# Patient Record
Sex: FEMALE | URBAN - NONMETROPOLITAN AREA
[De-identification: names, ages, dates, MRNs, and addresses within clinical notes are randomized per-mention and may not be internally consistent; named-entity substitution may affect disease eponyms.]

---

## 2024-01-25 ENCOUNTER — OFFICE VISIT (OUTPATIENT)
Dept: URBAN - NONMETROPOLITAN AREA CLINIC 13 | Facility: CLINIC | Age: 39
End: 2024-01-25
Payer: COMMERCIAL

## 2024-01-25 ENCOUNTER — LAB OUTSIDE AN ENCOUNTER (OUTPATIENT)
Dept: URBAN - NONMETROPOLITAN AREA CLINIC 13 | Facility: CLINIC | Age: 39
End: 2024-01-25

## 2024-01-25 VITALS
BODY MASS INDEX: 35.69 KG/M2 | HEIGHT: 67 IN | DIASTOLIC BLOOD PRESSURE: 93 MMHG | HEART RATE: 114 BPM | WEIGHT: 227.4 LBS | SYSTOLIC BLOOD PRESSURE: 136 MMHG

## 2024-01-25 DIAGNOSIS — R10.30 LOWER ABDOMINAL PAIN: ICD-10-CM

## 2024-01-25 DIAGNOSIS — R93.3 ABN FINDINGS-GI TRACT: ICD-10-CM

## 2024-01-25 DIAGNOSIS — D25.9 UTERINE LEIOMYOMA, UNSPECIFIED LOCATION: ICD-10-CM

## 2024-01-25 DIAGNOSIS — Z98.84 HX OF GASTRIC BYPASS: ICD-10-CM

## 2024-01-25 PROBLEM — 275538002: Status: ACTIVE | Noted: 2024-01-25

## 2024-01-25 PROCEDURE — 99204 OFFICE O/P NEW MOD 45 MIN: CPT | Performed by: NURSE PRACTITIONER

## 2024-01-25 RX ORDER — DICYCLOMINE HYDROCHLORIDE 10 MG/1
1 CAPSULE 30 MINUTES BEFORE EATING CAPSULE ORAL
Qty: 60 | Refills: 11 | OUTPATIENT
Start: 2024-01-25 | End: 2025-01-19

## 2024-01-25 NOTE — HPI-TODAY'S VISIT:
Ms. Linda is a 38-year-old female who presents today for lower abdominal pain x 6 months.  She has a history of gastric bypass and typically has intermittent nausea and occasional abdominal pain, but this pain is more constant and lower than usual.  She presented to her gynecologist where she was diagnosed with anemia thought secondary to menorrhagia.  Pelvic ultrasound was also obtained showing bilateral fibroids.  Abdominal pain persisted and she presented to [place] where CT scan showed mild wall thickening involving the anterior sigmoid colon that may be related to incomplete distention, and a mild component of colitis was difficult to exclude.  Colonoscopy is recommended to rule out pathologic cause.  No previous colonoscopy.  No change in her bowel habits.  Typically has 1 soft formed stool per day.  Denies any hematochezia, melena, or hematemesis. States she has had dark green stools 2/2 oral iron intake.  No family history of colon cancer.  LG.

## 2024-04-24 ENCOUNTER — COLON (OUTPATIENT)
Dept: URBAN - NONMETROPOLITAN AREA SURGERY CENTER 1 | Facility: SURGERY CENTER | Age: 39
End: 2024-04-24

## 2024-05-21 ENCOUNTER — OFFICE VISIT (OUTPATIENT)
Dept: URBAN - NONMETROPOLITAN AREA CLINIC 13 | Facility: CLINIC | Age: 39
End: 2024-05-21
Payer: COMMERCIAL

## 2024-05-21 ENCOUNTER — LAB OUTSIDE AN ENCOUNTER (OUTPATIENT)
Dept: URBAN - NONMETROPOLITAN AREA CLINIC 13 | Facility: CLINIC | Age: 39
End: 2024-05-21

## 2024-05-21 ENCOUNTER — CLAIMS CREATED FROM THE CLAIM WINDOW (OUTPATIENT)
Dept: URBAN - NONMETROPOLITAN AREA CLINIC 13 | Facility: CLINIC | Age: 39
End: 2024-05-21

## 2024-05-21 ENCOUNTER — DASHBOARD ENCOUNTERS (OUTPATIENT)
Age: 39
End: 2024-05-21

## 2024-05-21 VITALS
HEART RATE: 109 BPM | DIASTOLIC BLOOD PRESSURE: 88 MMHG | HEIGHT: 67 IN | SYSTOLIC BLOOD PRESSURE: 137 MMHG | WEIGHT: 229 LBS | BODY MASS INDEX: 35.94 KG/M2

## 2024-05-21 DIAGNOSIS — Z98.84 HX OF GASTRIC BYPASS: ICD-10-CM

## 2024-05-21 DIAGNOSIS — R10.84 ABDOMINAL PAIN, GENERALIZED: ICD-10-CM

## 2024-05-21 DIAGNOSIS — Z86.2 HISTORY OF ANEMIA: ICD-10-CM

## 2024-05-21 PROCEDURE — 99214 OFFICE O/P EST MOD 30 MIN: CPT | Performed by: NURSE PRACTITIONER

## 2024-05-21 RX ORDER — DICYCLOMINE HYDROCHLORIDE 10 MG/1
1 CAPSULE 30 MINUTES BEFORE EATING CAPSULE ORAL
Qty: 60 | Refills: 11 | OUTPATIENT

## 2024-05-21 RX ORDER — DICYCLOMINE HYDROCHLORIDE 10 MG/1
1 CAPSULE 30 MINUTES BEFORE EATING CAPSULE ORAL
Qty: 60 | Refills: 11 | Status: ACTIVE | COMMUNITY
Start: 2024-01-25 | End: 2025-01-19

## 2024-05-31 ENCOUNTER — OFFICE VISIT (OUTPATIENT)
Dept: URBAN - NONMETROPOLITAN AREA SURGERY CENTER 1 | Facility: SURGERY CENTER | Age: 39
End: 2024-05-31

## 2024-06-27 ENCOUNTER — OFFICE VISIT (OUTPATIENT)
Dept: URBAN - NONMETROPOLITAN AREA CLINIC 13 | Facility: CLINIC | Age: 39
End: 2024-06-27
Payer: COMMERCIAL

## 2024-06-27 VITALS
WEIGHT: 224.4 LBS | HEART RATE: 94 BPM | BODY MASS INDEX: 35.22 KG/M2 | SYSTOLIC BLOOD PRESSURE: 123 MMHG | HEIGHT: 67 IN | DIASTOLIC BLOOD PRESSURE: 90 MMHG

## 2024-06-27 DIAGNOSIS — Z86.2 HISTORY OF ANEMIA: ICD-10-CM

## 2024-06-27 DIAGNOSIS — Z98.84 HX OF GASTRIC BYPASS: ICD-10-CM

## 2024-06-27 DIAGNOSIS — R10.30 LOWER ABDOMINAL PAIN: ICD-10-CM

## 2024-06-27 DIAGNOSIS — D25.9 UTERINE LEIOMYOMA, UNSPECIFIED LOCATION: ICD-10-CM

## 2024-06-27 DIAGNOSIS — K58.0 IRRITABLE BOWEL SYNDROME WITH DIARRHEA: ICD-10-CM

## 2024-06-27 DIAGNOSIS — R93.3 ABNORMAL COMPUTED TOMOGRAPHY OF SIGMOID COLON: ICD-10-CM

## 2024-06-27 PROBLEM — 197125005: Status: ACTIVE | Noted: 2024-06-27

## 2024-06-27 PROCEDURE — 99214 OFFICE O/P EST MOD 30 MIN: CPT | Performed by: NURSE PRACTITIONER

## 2024-06-27 RX ORDER — DICYCLOMINE HYDROCHLORIDE 10 MG/1
1 CAPSULE 30 MINUTES BEFORE EATING CAPSULE ORAL
Qty: 60 | Refills: 11 | Status: ACTIVE | COMMUNITY

## 2024-06-27 RX ORDER — RIFAXIMIN 550 MG/1
1 TABLET TABLET ORAL THREE TIMES A DAY
Qty: 42 TABLET | Refills: 0 | OUTPATIENT
Start: 2024-06-27 | End: 2024-07-11

## 2024-06-27 RX ORDER — DICYCLOMINE HYDROCHLORIDE 10 MG/1
1 CAPSULE 30 MINUTES BEFORE EATING CAPSULE ORAL
Qty: 60 | Refills: 11 | OUTPATIENT

## 2024-06-27 NOTE — HPI-OTHER HISTORIES
1/2024:  Ms. Linda is a 38-year-old female who presents today for lower abdominal pain x 6 months. She has a history of gastric bypass and typically has intermittent nausea and occasional abdominal pain, but this pain is more constant and lower than usual. She presented to her gynecologist where she was diagnosed with anemia thought secondary to menorrhagia. Pelvic ultrasound was also obtained showing bilateral fibroids. Abdominal pain persisted and she presented to [place] where CT scan showed mild wall thickening involving the anterior sigmoid colon that may be related to incomplete distention, and a mild component of colitis was difficult to exclude. Colonoscopy is recommended to rule out pathologic cause. No previous colonoscopy. No change in her bowel habits. Typically has 1 soft formed stool per day. Denies any hematochezia, melena, or hematemesis. States she has had dark green stools 2/2 oral iron intake. No family history of colon cancer. LG.  5/21/2024: Ms. Alyssa Linda is a 38-year-old female who presents today for follow-up of lower abdominal pain. Since her last visit she underwent laparoscopic removal of uterine fibroids. Her bleeding and pain has since resolved. She takes Bentyl as needed for intermittent cramping that she experiences prior to a bowel movement. Her concern today is intermittent indigestion" noises "that her stomach is began to make since her surgery. Occurs after she eats. She has a history of gastric bypass and is concerned about the anatomy. We discussed starting a probiotic and she agrees to this plan. Requesting EGD for further evaluation. Given history of anemia we will proceed. LG.

## 2024-06-27 NOTE — HPI-TODAY'S VISIT:
Alyssa returns for follow-up o intermittent indigestion and "noises in her stomach ".  Since her last visit she had an EGD with normal findings including a healthy anastomosis of her prior Jessica-en-Y.  She worries because she continues to hear the noises when she eats without improvement from align probiotic.  She is scheduled to see her OB next week to repeat labs and further evaluate for anemia.  No other GI questions or concerns today.  LG.

## 2024-06-28 ENCOUNTER — TELEPHONE ENCOUNTER (OUTPATIENT)
Dept: URBAN - NONMETROPOLITAN AREA CLINIC 13 | Facility: CLINIC | Age: 39
End: 2024-06-28

## 2024-09-26 ENCOUNTER — OFFICE VISIT (OUTPATIENT)
Dept: URBAN - NONMETROPOLITAN AREA CLINIC 13 | Facility: CLINIC | Age: 39
End: 2024-09-26
Payer: COMMERCIAL

## 2024-09-26 ENCOUNTER — LAB OUTSIDE AN ENCOUNTER (OUTPATIENT)
Dept: URBAN - NONMETROPOLITAN AREA CLINIC 13 | Facility: CLINIC | Age: 39
End: 2024-09-26

## 2024-09-26 VITALS
HEIGHT: 67 IN | DIASTOLIC BLOOD PRESSURE: 87 MMHG | SYSTOLIC BLOOD PRESSURE: 120 MMHG | WEIGHT: 234.6 LBS | HEART RATE: 102 BPM | BODY MASS INDEX: 36.82 KG/M2

## 2024-09-26 DIAGNOSIS — D25.9 UTERINE LEIOMYOMA, UNSPECIFIED LOCATION: ICD-10-CM

## 2024-09-26 DIAGNOSIS — K58.0 IRRITABLE BOWEL SYNDROME WITH DIARRHEA: ICD-10-CM

## 2024-09-26 DIAGNOSIS — Z86.2 HISTORY OF ANEMIA: ICD-10-CM

## 2024-09-26 DIAGNOSIS — R10.30 LOWER ABDOMINAL PAIN: ICD-10-CM

## 2024-09-26 DIAGNOSIS — R93.3 ABNORMAL COMPUTED TOMOGRAPHY OF SIGMOID COLON: ICD-10-CM

## 2024-09-26 DIAGNOSIS — Z98.84 HX OF GASTRIC BYPASS: ICD-10-CM

## 2024-09-26 DIAGNOSIS — K62.5 BRBPR (BRIGHT RED BLOOD PER RECTUM): ICD-10-CM

## 2024-09-26 PROCEDURE — 99213 OFFICE O/P EST LOW 20 MIN: CPT | Performed by: NURSE PRACTITIONER

## 2024-09-26 RX ORDER — DICYCLOMINE HYDROCHLORIDE 10 MG/1
1 CAPSULE 30 MINUTES BEFORE EATING CAPSULE ORAL
Qty: 60 | Refills: 11 | Status: ACTIVE | COMMUNITY

## 2024-09-26 NOTE — HPI-OTHER HISTORIES
1/2024:  Ms. Linda is a 38-year-old female who presents today for lower abdominal pain x 6 months. She has a history of gastric bypass and typically has intermittent nausea and occasional abdominal pain, but this pain is more constant and lower than usual. She presented to her gynecologist where she was diagnosed with anemia thought secondary to menorrhagia. Pelvic ultrasound was also obtained showing bilateral fibroids. Abdominal pain persisted and she presented to [place] where CT scan showed mild wall thickening involving the anterior sigmoid colon that may be related to incomplete distention, and a mild component of colitis was difficult to exclude. Colonoscopy is recommended to rule out pathologic cause. No previous colonoscopy. No change in her bowel habits. Typically has 1 soft formed stool per day. Denies any hematochezia, melena, or hematemesis. States she has had dark green stools 2/2 oral iron intake. No family history of colon cancer. LG.  5/21/2024: Ms. Alyssa Linda is a 38-year-old female who presents today for follow-up of lower abdominal pain. Since her last visit she underwent laparoscopic removal of uterine fibroids. Her bleeding and pain has since resolved. She takes Bentyl as needed for intermittent cramping that she experiences prior to a bowel movement. Her concern today is intermittent indigestion" noises "that her stomach is began to make since her surgery. Occurs after she eats. She has a history of gastric bypass and is concerned about the anatomy. We discussed starting a probiotic and she agrees to this plan. Requesting EGD for further evaluation. Given history of anemia we will proceed. LG.  6/27/2024:  Alyssa returns for follow-up o intermittent indigestion and "noises in her stomach ". Since her last visit she had an EGD with normal findings including a healthy anastomosis of her prior Jessica-en-Y. She worries because she continues to hear the noises when she eats without improvement from align probiotic. She is scheduled to see her OB next week to repeat labs and further evaluate for anemia. No other GI questions or concerns today. LG.

## 2024-09-26 NOTE — HPI-TODAY'S VISIT:
Wali returns for follow-up of anemia and lower abdominal discomfort.  Today she reports doing fairly well.  She continues to be anemic per labs obtained by gynecologist.  She did have 1 episode of bright red blood per rectum but she has a history of hemorrhoids and thought this was the cause.  She is not needing Bentyl for abdominal discomfort.  Her stools have been very consistent since completing Xifaxan.  LG.

## 2024-10-18 ENCOUNTER — TELEPHONE ENCOUNTER (OUTPATIENT)
Dept: URBAN - NONMETROPOLITAN AREA CLINIC 2 | Facility: CLINIC | Age: 39
End: 2024-10-18

## 2024-10-18 ENCOUNTER — LAB OUTSIDE AN ENCOUNTER (OUTPATIENT)
Dept: URBAN - NONMETROPOLITAN AREA CLINIC 2 | Facility: CLINIC | Age: 39
End: 2024-10-18

## 2024-10-18 PROBLEM — 87522002: Status: ACTIVE | Noted: 2024-10-18

## 2024-11-11 ENCOUNTER — OFFICE VISIT (OUTPATIENT)
Dept: URBAN - NONMETROPOLITAN AREA CLINIC 1 | Facility: CLINIC | Age: 39
End: 2024-11-11
Payer: COMMERCIAL

## 2024-11-11 DIAGNOSIS — D50.9 ANEMIA: ICD-10-CM

## 2024-11-11 PROCEDURE — 91110 GI TRC IMG INTRAL ESOPH-ILE: CPT | Performed by: INTERNAL MEDICINE

## 2024-11-11 RX ORDER — DICYCLOMINE HYDROCHLORIDE 10 MG/1
1 CAPSULE 30 MINUTES BEFORE EATING CAPSULE ORAL
Qty: 60 | Refills: 11 | Status: ACTIVE | COMMUNITY

## 2024-11-14 ENCOUNTER — LAB OUTSIDE AN ENCOUNTER (OUTPATIENT)
Dept: URBAN - NONMETROPOLITAN AREA CLINIC 2 | Facility: CLINIC | Age: 39
End: 2024-11-14

## 2024-11-14 ENCOUNTER — TELEPHONE ENCOUNTER (OUTPATIENT)
Dept: URBAN - NONMETROPOLITAN AREA CLINIC 2 | Facility: CLINIC | Age: 39
End: 2024-11-14

## 2025-01-07 ENCOUNTER — OFFICE VISIT (OUTPATIENT)
Dept: URBAN - NONMETROPOLITAN AREA CLINIC 13 | Facility: CLINIC | Age: 40
End: 2025-01-07
Payer: COMMERCIAL

## 2025-01-07 VITALS
SYSTOLIC BLOOD PRESSURE: 129 MMHG | BODY MASS INDEX: 37.92 KG/M2 | DIASTOLIC BLOOD PRESSURE: 94 MMHG | HEART RATE: 75 BPM | HEIGHT: 67 IN | WEIGHT: 241.6 LBS

## 2025-01-07 DIAGNOSIS — D50.8 OTHER IRON DEFICIENCY ANEMIAS: ICD-10-CM

## 2025-01-07 DIAGNOSIS — K58.0 IRRITABLE BOWEL SYNDROME WITH DIARRHEA: ICD-10-CM

## 2025-01-07 DIAGNOSIS — Z98.84 HX OF GASTRIC BYPASS: ICD-10-CM

## 2025-01-07 DIAGNOSIS — D50.9 IRON DEFICIENCY ANEMIA, UNSPECIFIED IRON DEFICIENCY ANEMIA TYPE: ICD-10-CM

## 2025-01-07 DIAGNOSIS — Z86.2 HISTORY OF ANEMIA: ICD-10-CM

## 2025-01-07 DIAGNOSIS — R10.30 LOWER ABDOMINAL PAIN: ICD-10-CM

## 2025-01-07 DIAGNOSIS — R93.3 ABNORMAL COMPUTED TOMOGRAPHY OF SIGMOID COLON: ICD-10-CM

## 2025-01-07 DIAGNOSIS — K62.5 BRBPR (BRIGHT RED BLOOD PER RECTUM): ICD-10-CM

## 2025-01-07 DIAGNOSIS — D25.9 UTERINE LEIOMYOMA, UNSPECIFIED LOCATION: ICD-10-CM

## 2025-01-07 PROCEDURE — 99214 OFFICE O/P EST MOD 30 MIN: CPT | Performed by: NURSE PRACTITIONER

## 2025-01-07 RX ORDER — DICYCLOMINE HYDROCHLORIDE 10 MG/1
1 CAPSULE 30 MINUTES BEFORE EATING CAPSULE ORAL
Qty: 60 | Refills: 11 | Status: ON HOLD | COMMUNITY

## 2025-01-07 RX ORDER — DICYCLOMINE HYDROCHLORIDE 10 MG/1
1 CAPSULE 30 MINUTES BEFORE EATING CAPSULE ORAL
Qty: 60 | Refills: 11
End: 2026-01-02

## 2025-01-07 NOTE — HPI-TODAY'S VISIT:
Wali Linda presents for follow-up of iron deficiency anemia and lower abdominal discomfort.  Since her last visit she had a PillCam performed where no source of bleeding was identified.  She is following with hematology and is scheduled to receive IV iron next week.  She continues to deny any overt bleeding.  Lower abdominal pain remains controlled using Bentyl as needed for right lower quadrant discomfort that precedes a bowel movement.  She is having soft daily stools since completion of Xifaxan.  Feels well overall.  LG.

## 2025-01-07 NOTE — HPI-OTHER HISTORIES
1/2024:  Ms. Linda is a 38-year-old female who presents today for lower abdominal pain x 6 months. She has a history of gastric bypass and typically has intermittent nausea and occasional abdominal pain, but this pain is more constant and lower than usual. She presented to her gynecologist where she was diagnosed with anemia thought secondary to menorrhagia. Pelvic ultrasound was also obtained showing bilateral fibroids. Abdominal pain persisted and she presented to [place] where CT scan showed mild wall thickening involving the anterior sigmoid colon that may be related to incomplete distention, and a mild component of colitis was difficult to exclude. Colonoscopy is recommended to rule out pathologic cause. No previous colonoscopy. No change in her bowel habits. Typically has 1 soft formed stool per day. Denies any hematochezia, melena, or hematemesis. States she has had dark green stools 2/2 oral iron intake. No family history of colon cancer. LG.  5/21/2024: Ms. Alyssa Linda is a 38-year-old female who presents today for follow-up of lower abdominal pain. Since her last visit she underwent laparoscopic removal of uterine fibroids. Her bleeding and pain has since resolved. She takes Bentyl as needed for intermittent cramping that she experiences prior to a bowel movement. Her concern today is intermittent indigestion" noises "that her stomach is began to make since her surgery. Occurs after she eats. She has a history of gastric bypass and is concerned about the anatomy. We discussed starting a probiotic and she agrees to this plan. Requesting EGD for further evaluation. Given history of anemia we will proceed. LG.  6/27/2024:  Alyssa returns for follow-up o intermittent indigestion and "noises in her stomach ". Since her last visit she had an EGD with normal findings including a healthy anastomosis of her prior Jessica-en-Y. She worries because she continues to hear the noises when she eats without improvement from align probiotic. She is scheduled to see her OB next week to repeat labs and further evaluate for anemia. No other GI questions or concerns today. LG.  9/26/2024:  Wali returns for follow-up of anemia and lower abdominal discomfort. Today she reports doing fairly well. She continues to be anemic per labs obtained by gynecologist. She did have 1 episode of bright red blood per rectum but she has a history of hemorrhoids and thought this was the cause. She is not needing Bentyl for abdominal discomfort. Her stools have been very consistent since completing Xifaxan. LG.  pillcam 11/2024 - no source of bleeding identifiedd